# Patient Record
Sex: FEMALE | Race: WHITE | NOT HISPANIC OR LATINO
[De-identification: names, ages, dates, MRNs, and addresses within clinical notes are randomized per-mention and may not be internally consistent; named-entity substitution may affect disease eponyms.]

---

## 2017-01-05 ENCOUNTER — RX RENEWAL (OUTPATIENT)
Age: 82
End: 2017-01-05

## 2017-01-11 ENCOUNTER — RX RENEWAL (OUTPATIENT)
Age: 82
End: 2017-01-11

## 2017-01-12 ENCOUNTER — APPOINTMENT (OUTPATIENT)
Dept: CARDIOLOGY | Facility: CLINIC | Age: 82
End: 2017-01-12

## 2017-01-12 LAB — INR PPP: 2.6 RATIO

## 2017-02-02 ENCOUNTER — APPOINTMENT (OUTPATIENT)
Dept: CARDIOLOGY | Facility: CLINIC | Age: 82
End: 2017-02-02

## 2017-02-03 LAB — INR PPP: 2.8 RATIO

## 2017-02-09 ENCOUNTER — APPOINTMENT (OUTPATIENT)
Dept: CARDIOLOGY | Facility: CLINIC | Age: 82
End: 2017-02-09

## 2017-02-10 ENCOUNTER — MEDICATION RENEWAL (OUTPATIENT)
Age: 82
End: 2017-02-10

## 2017-02-17 LAB — INR PPP: 1.7 RATIO

## 2017-02-23 ENCOUNTER — RX RENEWAL (OUTPATIENT)
Age: 82
End: 2017-02-23

## 2017-02-28 ENCOUNTER — NON-APPOINTMENT (OUTPATIENT)
Age: 82
End: 2017-02-28

## 2017-02-28 ENCOUNTER — APPOINTMENT (OUTPATIENT)
Dept: CARDIOLOGY | Facility: CLINIC | Age: 82
End: 2017-02-28

## 2017-02-28 VITALS
HEART RATE: 70 BPM | DIASTOLIC BLOOD PRESSURE: 74 MMHG | SYSTOLIC BLOOD PRESSURE: 182 MMHG | BODY MASS INDEX: 24.45 KG/M2 | WEIGHT: 138 LBS | OXYGEN SATURATION: 93 % | HEIGHT: 63 IN

## 2017-02-28 LAB — INR PPP: 2.6 RATIO

## 2017-03-01 LAB
ALBUMIN SERPL ELPH-MCNC: 4 G/DL
ALP BLD-CCNC: 54 U/L
ALT SERPL-CCNC: 19 U/L
ANION GAP SERPL CALC-SCNC: 16 MMOL/L
AST SERPL-CCNC: 22 U/L
BASOPHILS # BLD AUTO: 0.01 K/UL
BASOPHILS NFR BLD AUTO: 0.1 %
BILIRUB SERPL-MCNC: 0.4 MG/DL
BUN SERPL-MCNC: 17 MG/DL
CALCIUM SERPL-MCNC: 9.5 MG/DL
CHLORIDE SERPL-SCNC: 93 MMOL/L
CHOLEST SERPL-MCNC: 158 MG/DL
CHOLEST/HDLC SERPL: 2.3 RATIO
CO2 SERPL-SCNC: 29 MMOL/L
CREAT SERPL-MCNC: 0.65 MG/DL
EOSINOPHIL # BLD AUTO: 0.07 K/UL
EOSINOPHIL NFR BLD AUTO: 0.9 %
GLUCOSE SERPL-MCNC: 101 MG/DL
HCT VFR BLD CALC: 40.1 %
HDLC SERPL-MCNC: 68 MG/DL
HGB BLD-MCNC: 12.9 G/DL
IMM GRANULOCYTES NFR BLD AUTO: 0.1 %
LDLC SERPL CALC-MCNC: 65 MG/DL
LYMPHOCYTES # BLD AUTO: 1.41 K/UL
LYMPHOCYTES NFR BLD AUTO: 17.5 %
MAN DIFF?: NORMAL
MCHC RBC-ENTMCNC: 28.8 PG
MCHC RBC-ENTMCNC: 32.2 GM/DL
MCV RBC AUTO: 89.5 FL
MONOCYTES # BLD AUTO: 0.74 K/UL
MONOCYTES NFR BLD AUTO: 9.2 %
NEUTROPHILS # BLD AUTO: 5.8 K/UL
NEUTROPHILS NFR BLD AUTO: 72.2 %
NT-PROBNP SERPL-MCNC: 872 PG/ML
PLATELET # BLD AUTO: 261 K/UL
POTASSIUM SERPL-SCNC: 5.3 MMOL/L
PROT SERPL-MCNC: 6.6 G/DL
RBC # BLD: 4.48 M/UL
RBC # FLD: 13.9 %
SODIUM SERPL-SCNC: 138 MMOL/L
TRIGL SERPL-MCNC: 126 MG/DL
TSH SERPL-ACNC: 1.89 UIU/ML
WBC # FLD AUTO: 8.04 K/UL

## 2017-03-23 ENCOUNTER — APPOINTMENT (OUTPATIENT)
Dept: CARDIOLOGY | Facility: CLINIC | Age: 82
End: 2017-03-23

## 2017-03-23 VITALS — HEART RATE: 70 BPM | BODY MASS INDEX: 24.45 KG/M2 | HEIGHT: 63 IN | WEIGHT: 138 LBS

## 2017-03-23 LAB
INR PPP: 1.5 RATIO
QUALITY CONTROL: YES

## 2017-04-04 ENCOUNTER — APPOINTMENT (OUTPATIENT)
Dept: CARDIOLOGY | Facility: CLINIC | Age: 82
End: 2017-04-04

## 2017-04-04 ENCOUNTER — NON-APPOINTMENT (OUTPATIENT)
Age: 82
End: 2017-04-04

## 2017-04-04 VITALS — BODY MASS INDEX: 25.76 KG/M2 | WEIGHT: 140 LBS | HEIGHT: 62 IN | HEART RATE: 64 BPM

## 2017-04-04 VITALS
BODY MASS INDEX: 25.76 KG/M2 | OXYGEN SATURATION: 93 % | HEART RATE: 64 BPM | HEIGHT: 62 IN | WEIGHT: 140 LBS | DIASTOLIC BLOOD PRESSURE: 69 MMHG | SYSTOLIC BLOOD PRESSURE: 144 MMHG

## 2017-04-04 LAB
INR PPP: 2.7 RATIO
QUALITY CONTROL: YES

## 2017-04-26 ENCOUNTER — APPOINTMENT (OUTPATIENT)
Dept: CARDIOLOGY | Facility: CLINIC | Age: 82
End: 2017-04-26

## 2017-04-26 VITALS — HEIGHT: 62 IN | BODY MASS INDEX: 25.76 KG/M2 | HEART RATE: 64 BPM | WEIGHT: 140 LBS

## 2017-04-26 LAB
INR PPP: 3 RATIO
QUALITY CONTROL: YES

## 2017-05-16 ENCOUNTER — APPOINTMENT (OUTPATIENT)
Dept: CARDIOLOGY | Facility: CLINIC | Age: 82
End: 2017-05-16

## 2017-05-16 VITALS — HEART RATE: 64 BPM | HEIGHT: 62 IN | WEIGHT: 140 LBS | BODY MASS INDEX: 25.76 KG/M2

## 2017-05-16 LAB
INR PPP: 2.6 RATIO
QUALITY CONTROL: YES

## 2017-06-13 ENCOUNTER — APPOINTMENT (OUTPATIENT)
Dept: CARDIOLOGY | Facility: CLINIC | Age: 82
End: 2017-06-13

## 2017-06-13 VITALS — WEIGHT: 140 LBS | BODY MASS INDEX: 25.76 KG/M2 | HEIGHT: 62 IN | HEART RATE: 64 BPM

## 2017-06-13 LAB
INR PPP: 2.1 RATIO
QUALITY CONTROL: YES

## 2017-06-20 ENCOUNTER — RX RENEWAL (OUTPATIENT)
Age: 82
End: 2017-06-20

## 2017-06-28 ENCOUNTER — APPOINTMENT (OUTPATIENT)
Dept: CARDIOLOGY | Facility: CLINIC | Age: 82
End: 2017-06-28

## 2017-06-28 VITALS — RESPIRATION RATE: 12 BRPM | HEART RATE: 68 BPM | SYSTOLIC BLOOD PRESSURE: 148 MMHG | DIASTOLIC BLOOD PRESSURE: 80 MMHG

## 2017-07-10 LAB — INR PPP: 2

## 2017-07-11 ENCOUNTER — APPOINTMENT (OUTPATIENT)
Dept: CARDIOLOGY | Facility: CLINIC | Age: 82
End: 2017-07-11

## 2017-08-09 LAB — INR PPP: 2.3

## 2017-09-06 LAB — INR PPP: 1.6

## 2017-09-20 LAB — INR PPP: 2

## 2017-10-11 LAB — INR PPP: 2.7

## 2017-11-06 LAB — INR PPP: 2.5

## 2017-12-04 ENCOUNTER — OTHER (OUTPATIENT)
Age: 82
End: 2017-12-04

## 2017-12-04 LAB — INR PPP: 2.3

## 2018-01-03 ENCOUNTER — NON-APPOINTMENT (OUTPATIENT)
Age: 83
End: 2018-01-03

## 2018-01-03 ENCOUNTER — APPOINTMENT (OUTPATIENT)
Dept: CARDIOLOGY | Facility: CLINIC | Age: 83
End: 2018-01-03
Payer: MEDICARE

## 2018-01-03 VITALS
HEART RATE: 64 BPM | WEIGHT: 140 LBS | DIASTOLIC BLOOD PRESSURE: 71 MMHG | OXYGEN SATURATION: 97 % | HEIGHT: 62 IN | BODY MASS INDEX: 25.76 KG/M2 | SYSTOLIC BLOOD PRESSURE: 162 MMHG

## 2018-01-03 PROCEDURE — 85610 PROTHROMBIN TIME: CPT | Mod: QW

## 2018-01-03 PROCEDURE — 93000 ELECTROCARDIOGRAM COMPLETE: CPT

## 2018-01-03 PROCEDURE — 99214 OFFICE O/P EST MOD 30 MIN: CPT

## 2018-01-03 RX ORDER — FUROSEMIDE 20 MG/1
20 TABLET ORAL DAILY
Qty: 1 | Refills: 3 | Status: DISCONTINUED | COMMUNITY
Start: 2017-02-28 | End: 2018-01-03

## 2018-01-05 VITALS — HEIGHT: 62 IN | BODY MASS INDEX: 25.76 KG/M2 | WEIGHT: 140 LBS | HEART RATE: 64 BPM

## 2018-01-05 LAB — INR PPP: 2.8

## 2018-01-08 ENCOUNTER — RX RENEWAL (OUTPATIENT)
Age: 83
End: 2018-01-08

## 2018-02-02 LAB — INR PPP: 2.5

## 2018-02-28 LAB — INR PPP: 2.8

## 2018-03-05 ENCOUNTER — APPOINTMENT (OUTPATIENT)
Dept: CARDIOLOGY | Facility: CLINIC | Age: 83
End: 2018-03-05
Payer: MEDICARE

## 2018-03-05 ENCOUNTER — NON-APPOINTMENT (OUTPATIENT)
Age: 83
End: 2018-03-05

## 2018-03-05 VITALS
BODY MASS INDEX: 27.05 KG/M2 | OXYGEN SATURATION: 98 % | SYSTOLIC BLOOD PRESSURE: 155 MMHG | HEIGHT: 62 IN | WEIGHT: 147 LBS | DIASTOLIC BLOOD PRESSURE: 76 MMHG | HEART RATE: 69 BPM

## 2018-03-05 PROCEDURE — 93000 ELECTROCARDIOGRAM COMPLETE: CPT

## 2018-03-05 PROCEDURE — 99214 OFFICE O/P EST MOD 30 MIN: CPT

## 2018-03-28 ENCOUNTER — EMERGENCY (EMERGENCY)
Facility: HOSPITAL | Age: 83
LOS: 1 days | Discharge: HOME | End: 2018-03-28
Attending: STUDENT IN AN ORGANIZED HEALTH CARE EDUCATION/TRAINING PROGRAM | Admitting: STUDENT IN AN ORGANIZED HEALTH CARE EDUCATION/TRAINING PROGRAM
Payer: MEDICARE

## 2018-03-28 VITALS
SYSTOLIC BLOOD PRESSURE: 173 MMHG | TEMPERATURE: 99 F | DIASTOLIC BLOOD PRESSURE: 98 MMHG | HEART RATE: 100 BPM | OXYGEN SATURATION: 95 % | RESPIRATION RATE: 20 BRPM

## 2018-03-28 VITALS
TEMPERATURE: 98 F | HEART RATE: 76 BPM | SYSTOLIC BLOOD PRESSURE: 130 MMHG | DIASTOLIC BLOOD PRESSURE: 60 MMHG | RESPIRATION RATE: 14 BRPM | OXYGEN SATURATION: 100 %

## 2018-03-28 DIAGNOSIS — Z96.60 PRESENCE OF UNSPECIFIED ORTHOPEDIC JOINT IMPLANT: Chronic | ICD-10-CM

## 2018-03-28 LAB
ALBUMIN SERPL ELPH-MCNC: 3.7 G/DL — SIGNIFICANT CHANGE UP (ref 3.3–5)
ALP SERPL-CCNC: 59 U/L — SIGNIFICANT CHANGE UP (ref 40–120)
ALT FLD-CCNC: 38 U/L — SIGNIFICANT CHANGE UP (ref 12–78)
ANION GAP SERPL CALC-SCNC: 8 MMOL/L — SIGNIFICANT CHANGE UP (ref 5–17)
AST SERPL-CCNC: 24 U/L — SIGNIFICANT CHANGE UP (ref 15–37)
BASOPHILS # BLD AUTO: 0.1 K/UL — SIGNIFICANT CHANGE UP (ref 0–0.2)
BASOPHILS NFR BLD AUTO: 1.1 % — SIGNIFICANT CHANGE UP (ref 0–2)
BILIRUB SERPL-MCNC: 0.3 MG/DL — SIGNIFICANT CHANGE UP (ref 0.2–1.2)
BUN SERPL-MCNC: 22 MG/DL — SIGNIFICANT CHANGE UP (ref 7–23)
CALCIUM SERPL-MCNC: 8.6 MG/DL — SIGNIFICANT CHANGE UP (ref 8.5–10.1)
CHLORIDE SERPL-SCNC: 94 MMOL/L — LOW (ref 96–108)
CK MB BLD-MCNC: 1.9 % — SIGNIFICANT CHANGE UP (ref 0–3.5)
CK MB CFR SERPL CALC: 3.2 NG/ML — SIGNIFICANT CHANGE UP (ref 0–3.6)
CK SERPL-CCNC: 166 U/L — SIGNIFICANT CHANGE UP (ref 26–192)
CO2 SERPL-SCNC: 31 MMOL/L — SIGNIFICANT CHANGE UP (ref 22–31)
CREAT SERPL-MCNC: 0.67 MG/DL — SIGNIFICANT CHANGE UP (ref 0.5–1.3)
EOSINOPHIL # BLD AUTO: 0.2 K/UL — SIGNIFICANT CHANGE UP (ref 0–0.5)
EOSINOPHIL NFR BLD AUTO: 3 % — SIGNIFICANT CHANGE UP (ref 0–6)
GLUCOSE SERPL-MCNC: 128 MG/DL — HIGH (ref 70–99)
HCT VFR BLD CALC: 43.3 % — SIGNIFICANT CHANGE UP (ref 34.5–45)
HGB BLD-MCNC: 14.5 G/DL — SIGNIFICANT CHANGE UP (ref 11.5–15.5)
LYMPHOCYTES # BLD AUTO: 1.6 K/UL — SIGNIFICANT CHANGE UP (ref 1–3.3)
LYMPHOCYTES # BLD AUTO: 25.5 % — SIGNIFICANT CHANGE UP (ref 13–44)
MCHC RBC-ENTMCNC: 29.9 PG — SIGNIFICANT CHANGE UP (ref 27–34)
MCHC RBC-ENTMCNC: 33.6 GM/DL — SIGNIFICANT CHANGE UP (ref 32–36)
MCV RBC AUTO: 88.9 FL — SIGNIFICANT CHANGE UP (ref 80–100)
MONOCYTES # BLD AUTO: 0.8 K/UL — SIGNIFICANT CHANGE UP (ref 0–0.9)
MONOCYTES NFR BLD AUTO: 12.4 % — HIGH (ref 1–9)
NEUTROPHILS # BLD AUTO: 3.6 K/UL — SIGNIFICANT CHANGE UP (ref 1.8–7.4)
NEUTROPHILS NFR BLD AUTO: 58 % — SIGNIFICANT CHANGE UP (ref 43–77)
NT-PROBNP SERPL-SCNC: 282 PG/ML — SIGNIFICANT CHANGE UP (ref 0–450)
PLATELET # BLD AUTO: 209 K/UL — SIGNIFICANT CHANGE UP (ref 150–400)
POTASSIUM SERPL-MCNC: 2.9 MMOL/L — CRITICAL LOW (ref 3.5–5.3)
POTASSIUM SERPL-MCNC: 3.9 MMOL/L — SIGNIFICANT CHANGE UP (ref 3.5–5.3)
POTASSIUM SERPL-SCNC: 2.9 MMOL/L — CRITICAL LOW (ref 3.5–5.3)
POTASSIUM SERPL-SCNC: 3.9 MMOL/L — SIGNIFICANT CHANGE UP (ref 3.5–5.3)
PROT SERPL-MCNC: 7.1 G/DL — SIGNIFICANT CHANGE UP (ref 6–8.3)
RBC # BLD: 4.87 M/UL — SIGNIFICANT CHANGE UP (ref 3.8–5.2)
RBC # FLD: 12.5 % — SIGNIFICANT CHANGE UP (ref 10.3–14.5)
SODIUM SERPL-SCNC: 133 MMOL/L — LOW (ref 135–145)
TROPONIN I SERPL-MCNC: <.015 NG/ML — SIGNIFICANT CHANGE UP (ref 0.01–0.04)
WBC # BLD: 6.1 K/UL — SIGNIFICANT CHANGE UP (ref 3.8–10.5)
WBC # FLD AUTO: 6.1 K/UL — SIGNIFICANT CHANGE UP (ref 3.8–10.5)

## 2018-03-28 PROCEDURE — 71045 X-RAY EXAM CHEST 1 VIEW: CPT

## 2018-03-28 PROCEDURE — 82553 CREATINE MB FRACTION: CPT

## 2018-03-28 PROCEDURE — 99285 EMERGENCY DEPT VISIT HI MDM: CPT

## 2018-03-28 PROCEDURE — 84484 ASSAY OF TROPONIN QUANT: CPT

## 2018-03-28 PROCEDURE — 99284 EMERGENCY DEPT VISIT MOD MDM: CPT | Mod: 25

## 2018-03-28 PROCEDURE — 80053 COMPREHEN METABOLIC PANEL: CPT

## 2018-03-28 PROCEDURE — 96366 THER/PROPH/DIAG IV INF ADDON: CPT

## 2018-03-28 PROCEDURE — 82550 ASSAY OF CK (CPK): CPT

## 2018-03-28 PROCEDURE — 96365 THER/PROPH/DIAG IV INF INIT: CPT

## 2018-03-28 PROCEDURE — 84132 ASSAY OF SERUM POTASSIUM: CPT

## 2018-03-28 PROCEDURE — 85027 COMPLETE CBC AUTOMATED: CPT

## 2018-03-28 PROCEDURE — 83880 ASSAY OF NATRIURETIC PEPTIDE: CPT

## 2018-03-28 PROCEDURE — 93005 ELECTROCARDIOGRAM TRACING: CPT

## 2018-03-28 PROCEDURE — 71045 X-RAY EXAM CHEST 1 VIEW: CPT | Mod: 26

## 2018-03-28 RX ORDER — POTASSIUM CHLORIDE 20 MEQ
10 PACKET (EA) ORAL ONCE
Qty: 0 | Refills: 0 | Status: COMPLETED | OUTPATIENT
Start: 2018-03-28 | End: 2018-03-28

## 2018-03-28 RX ORDER — FUROSEMIDE 40 MG
1 TABLET ORAL
Qty: 0 | Refills: 0 | COMMUNITY

## 2018-03-28 RX ORDER — POTASSIUM CHLORIDE 20 MEQ
40 PACKET (EA) ORAL ONCE
Qty: 0 | Refills: 0 | Status: COMPLETED | OUTPATIENT
Start: 2018-03-28 | End: 2018-03-28

## 2018-03-28 RX ORDER — ZOLPIDEM TARTRATE 10 MG/1
1 TABLET ORAL
Qty: 0 | Refills: 0 | COMMUNITY

## 2018-03-28 RX ORDER — PANTOPRAZOLE SODIUM 20 MG/1
1 TABLET, DELAYED RELEASE ORAL
Qty: 0 | Refills: 0 | COMMUNITY

## 2018-03-28 RX ORDER — ESCITALOPRAM OXALATE 10 MG/1
0 TABLET, FILM COATED ORAL
Qty: 0 | Refills: 0 | COMMUNITY

## 2018-03-28 RX ORDER — METOPROLOL TARTRATE 50 MG
1 TABLET ORAL
Qty: 0 | Refills: 0 | COMMUNITY

## 2018-03-28 RX ORDER — SODIUM CHLORIDE 9 MG/ML
1000 INJECTION INTRAMUSCULAR; INTRAVENOUS; SUBCUTANEOUS ONCE
Qty: 0 | Refills: 0 | Status: COMPLETED | OUTPATIENT
Start: 2018-03-28 | End: 2018-03-28

## 2018-03-28 RX ORDER — NORTRIPTYLINE HYDROCHLORIDE 10 MG/1
1 CAPSULE ORAL
Qty: 0 | Refills: 0 | COMMUNITY

## 2018-03-28 RX ORDER — CHOLECALCIFEROL (VITAMIN D3) 125 MCG
1 CAPSULE ORAL
Qty: 0 | Refills: 0 | COMMUNITY

## 2018-03-28 RX ORDER — POTASSIUM CHLORIDE 20 MEQ
1 PACKET (EA) ORAL
Qty: 0 | Refills: 0 | COMMUNITY

## 2018-03-28 RX ORDER — DOXAZOSIN MESYLATE 4 MG
1 TABLET ORAL
Qty: 0 | Refills: 0 | COMMUNITY

## 2018-03-28 RX ORDER — WARFARIN SODIUM 2.5 MG/1
1 TABLET ORAL
Qty: 0 | Refills: 0 | COMMUNITY

## 2018-03-28 RX ORDER — SODIUM CHLORIDE 9 MG/ML
3 INJECTION INTRAMUSCULAR; INTRAVENOUS; SUBCUTANEOUS ONCE
Qty: 0 | Refills: 0 | Status: COMPLETED | OUTPATIENT
Start: 2018-03-28 | End: 2018-03-28

## 2018-03-28 RX ADMIN — Medication 100 MILLIEQUIVALENT(S): at 07:11

## 2018-03-28 RX ADMIN — Medication 40 MILLIEQUIVALENT(S): at 05:15

## 2018-03-28 RX ADMIN — SODIUM CHLORIDE 500 MILLILITER(S): 9 INJECTION INTRAMUSCULAR; INTRAVENOUS; SUBCUTANEOUS at 05:15

## 2018-03-28 RX ADMIN — SODIUM CHLORIDE 3 MILLILITER(S): 9 INJECTION INTRAMUSCULAR; INTRAVENOUS; SUBCUTANEOUS at 05:18

## 2018-03-28 RX ADMIN — Medication 100 MILLIEQUIVALENT(S): at 05:14

## 2018-03-28 NOTE — ED ADULT NURSE NOTE - OBJECTIVE STATEMENT
Pt to ED via EMS with c/c tachycardia. Pt states she got up to use the bathroom around 0200 and when she returned to bed, she felt as if her heart was beating fast. Pt states she feels better now other than mild nausea. Pt is noted with irregular heart beat, frequent PAC's

## 2018-03-28 NOTE — CONSULT NOTE ADULT - SUBJECTIVE AND OBJECTIVE BOX
[facility] - Mica Berumen, Veronique, Adriana, Francis, Atul Otero  Office Number: [phone]    Initial Consult Note  85 year female with a history of high cholesterol, a-fib, HTN presents with rapid heart beat.  She states she woke up at 2:00am to use the bathroom, felt her heart beating fast.   Associated with nausea.  Denies chest pain, SOB, weakness.  She has a history of a-fib, diagnosed last year, on warfarin 5mg QD. In ED, EKG shows sinus rhythm with PACs at 89bpm. Pt states she feels much improved. PMD Rob Perez, Cardiology Dr. Piper.        PAST MEDICAL & SURGICAL HISTORY:  Hyperlipidemia  Afib  Depression  Anxiety  HTN (hypertension)  S/P hip hemiarthroplasty      SOCIAL HISTORY:  No tobacco, ethanol, or drug abuse.    FAMILY HISTORY:  No pertinent family history in first degree relatives    No family history of acute MI or sudden cardiac death.    MEDICATIONS  (STANDING):    MEDICATIONS  (PRN):      Allergies    bromide (Unknown)    Intolerances        REVIEW OF SYSTEMS:    CONSTITUTIONAL: No weakness, fevers or chills  EYES/ENT: No visual changes;  No vertigo or throat pain   NECK: No pain or stiffness  RESPIRATORY: No cough, wheezing, hemoptysis; No shortness of breath  CARDIOVASCULAR: No chest pain or palpitations  GASTROINTESTINAL: No abdominal pain. No nausea, vomiting, or hematemesis; No diarrhea or constipation. No melena or hematochezia.  GENITOURINARY: No dysuria, frequency or hematuria  NEUROLOGICAL: No numbness or weakness  SKIN: No itching or rash  All other review of systems is negative unless indicated above    VITAL SIGNS:   Vital Signs Last 24 Hrs  T(C): 36.8 (28 Mar 2018 07:13), Max: 37.4 (28 Mar 2018 03:27)  T(F): 98.2 (28 Mar 2018 07:13), Max: 99.4 (28 Mar 2018 03:27)  HR: 74 (28 Mar 2018 07:13) (74 - 100)  BP: 150/55 (28 Mar 2018 07:13) (150/55 - 173/98)  BP(mean): --  RR: 14 (28 Mar 2018 07:13) (14 - 20)  SpO2: 100% (28 Mar 2018 07:13) (95% - 100%)    I&O's Summary      On Exam:    Constitutional: NAD, alert and oriented x 3  Lungs:  Non-labored, No wheezing, rales, soft diffuse crackles on LLL field  Cardiovascular: RRR.  S1 and S2 positive.  No murmurs, rubs, gallops or clicks  Gastrointestinal: Bowel Sounds present, soft, nontender.   Lymph: No peripheral edema. No cervical lymphadenopathy.  Neurological: Alert, no focal deficits  Skin: No rashes or ulcers   Psych:  Mood & affect appropriate.    LABS: All Labs Reviewed:                        14.5   6.1   )-----------( 209      ( 28 Mar 2018 03:58 )             43.3     28 Mar 2018 03:58    133    |  94     |  22     ----------------------------<  128    2.9     |  31     |  0.67     Ca    8.6        28 Mar 2018 03:58    TPro  7.1    /  Alb  3.7    /  TBili  0.3    /  DBili  x      /  AST  24     /  ALT  38     /  AlkPhos  59     28 Mar 2018 03:58      CARDIAC MARKERS ( 28 Mar 2018 03:58 )  <.015 ng/mL / x     / 166 U/L / x     / 3.2 ng/mL      Blood Culture:   03-28 @ 03:58  Pro Bnp 282        RADIOLOGY:    EKG:    Sinus with PACs [facility] - Mica Berumen, Veronique, Adriana, Francis, Atul Otero  Office Number: [phone]    Initial Consult Note  85 year female with a history of high cholesterol, a-fib, HTN presents with rapid heart beat.  She states she woke up at 2:00am to use the bathroom, felt her heart beating fast.   Associated with nausea.  Denies chest pain, SOB, weakness.  She has a history of a-fib, diagnosed last year, on warfarin 5mg QD. In ED, EKG shows sinus rhythm with PACs at 89bpm. Pt states she feels much improved. PMD Rob Perez, Cardiology Dr. Piper.        PAST MEDICAL & SURGICAL HISTORY:  Hyperlipidemia  Afib  Depression  Anxiety  HTN (hypertension)  S/P hip hemiarthroplasty      SOCIAL HISTORY:  No tobacco, ethanol, or drug abuse.    FAMILY HISTORY:  No pertinent family history in first degree relatives    No family history of acute MI or sudden cardiac death.    MEDICATIONS  (STANDING):    MEDICATIONS  (PRN):      Allergies    bromide (Unknown)    Intolerances        REVIEW OF SYSTEMS:    CONSTITUTIONAL: No weakness, fevers or chills  EYES/ENT: No visual changes;  No vertigo or throat pain   NECK: No pain or stiffness  RESPIRATORY: No cough, wheezing, hemoptysis; No shortness of breath  CARDIOVASCULAR: No chest pain or palpitations  GASTROINTESTINAL: No abdominal pain. No nausea, vomiting, or hematemesis; No diarrhea or constipation. No melena or hematochezia.  GENITOURINARY: No dysuria, frequency or hematuria  NEUROLOGICAL: No numbness or weakness  SKIN: No itching or rash  All other review of systems is negative unless indicated above    VITAL SIGNS:   Vital Signs Last 24 Hrs  T(C): 36.8 (28 Mar 2018 07:13), Max: 37.4 (28 Mar 2018 03:27)  T(F): 98.2 (28 Mar 2018 07:13), Max: 99.4 (28 Mar 2018 03:27)  HR: 74 (28 Mar 2018 07:13) (74 - 100)  BP: 150/55 (28 Mar 2018 07:13) (150/55 - 173/98)  BP(mean): --  RR: 14 (28 Mar 2018 07:13) (14 - 20)  SpO2: 100% (28 Mar 2018 07:13) (95% - 100%)    I&O's Summary      On Exam:    Constitutional: NAD, alert and oriented x 3  Lungs:  Non-labored, No wheezing, rales, soft diffuse crackles on LLL field  Cardiovascular: RRR.  S1 and S2 positive.  No murmurs, rubs, gallops or clicks  Gastrointestinal: Bowel Sounds present, soft, nontender.   Lymph: No peripheral edema. No cervical lymphadenopathy.  Neurological: Alert, no focal deficits  Skin: No rashes or ulcers   Psych:  Mood & affect appropriate.    LABS: All Labs Reviewed:                        14.5   6.1   )-----------( 209      ( 28 Mar 2018 03:58 )             43.3     28 Mar 2018 03:58    133    |  94     |  22     ----------------------------<  128    2.9     |  31     |  0.67     Ca    8.6        28 Mar 2018 03:58    TPro  7.1    /  Alb  3.7    /  TBili  0.3    /  DBili  x      /  AST  24     /  ALT  38     /  AlkPhos  59     28 Mar 2018 03:58      CARDIAC MARKERS ( 28 Mar 2018 03:58 )  <.015 ng/mL / x     / 166 U/L / x     / 3.2 ng/mL      Blood Culture:   03-28 @ 03:58  Pro Bnp 282      EKG:    Sinus with PACs, pattern of quadrigeminy

## 2018-03-28 NOTE — ED PROVIDER NOTE - CHPI ED SYMPTOMS NEG
no vomiting/no chills/no back pain/no dizziness/no shortness of breath/no fever/no diaphoresis/no chest pain/no cough

## 2018-03-28 NOTE — ED ADULT NURSE NOTE - PSH
Pt c/o pain to the right elbow. Pt states that she is unable to bend the elbow or use her right hand. Pt denies any source of injury. denies hx of gout. +pulses, color to right arm is pink, skin is warm and dry. Will continue to monitor. S/P hip hemiarthroplasty

## 2018-03-28 NOTE — CONSULT NOTE ADULT - ASSESSMENT
85 year female with a history of high cholesterol, a-fib, HTN presents with rapid heart beat, SVT    - Initial rhythm strip from 0249 shows SVT, but followup EKG shows sinus with PACs  - No clear evidence of acute ischemia, trops negative x 1.  - No evidence of volume overload, ProBNP 282  - No acute ischemic changes on EKG compared to previous  - Afib rate controlled with metoprolol succ 25mg BID, on AC (Warfarin 5mg QD)  - BP elevated, continue home BP meds, monitor routine hemodynamics  - monitor and replete lytes, keep K>4, Mg>2  - Pt has no history of MI, active CAD, ADHF or severe valvular disease.   - Other cardiovascular workup will depend on clinical course.  - All other workup per primary team  - Doubt need for admission 85 year female with a history of high cholesterol, a-fib, HTN presents with rapid heart beat, SVT    - Initial rhythm strip from 0249 shows SVT, but followup EKG shows sinus with PACs  - SVT most likely the results of hypokalemia, and enhanced by PACs  - Follow up potassium on repeat BMP, s/p KCl 40meq PO and KCl 20meq IV  - SVT terminated by vagal stimulation by EMS, as per report  - No clear evidence of acute ischemia, trops negative x 1.  - No evidence of volume overload, ProBNP 282  - No acute ischemic changes on EKG compared to previous  - Afib rate controlled with metoprolol succ 25mg BID, on AC (Warfarin 5mg QD)  - BP elevated, continue home BP meds, monitor routine hemodynamics  - monitor and replete lytes, keep K>4, Mg>2  - Pt has no history of MI, active CAD, ADHF or severe valvular disease.   - Other cardiovascular workup will depend on clinical course.  - All other workup per primary team  - Doubt need for admission 85 year female with a history of high cholesterol, a-fib, HTN presents with rapid heart beat, SVT    - Initial rhythm strip from 0249 shows SVT, but followup EKG shows sinus with PACs  - SVT most likely the results of hypokalemia, and triggered by PACs.  cannot fully exclude paf/flutter though appears very regular and was terminated by ems with a vagal maneuver  - Follow up potassium on repeat BMP, s/p KCl 40meq PO and KCl 20meq IV  - No clear evidence of acute ischemia  - No evidence of volume overload, ProBNP 282  - No acute ischemic changes on EKG compared to previous  - Afib rate controlled with metoprolol succ 25mg BID, on AC (Warfarin 5mg QD)  - BP elevated, continue home BP meds, monitor routine hemodynamics  - monitor and replete lytes, keep K>4, Mg>2  - Pt has no history of MI, active CAD, ADHF or severe valvular disease.   - Other cardiovascular workup will depend on clinical course.  -if k adequately repleted and she remains clinically stable she can be dc from a cv perspective

## 2018-03-28 NOTE — ED PROVIDER NOTE - OBJECTIVE STATEMENT
85 year female with a history of high cholesterol, a-fib, HTN presents with rapid heart beat.  She states she woke up at 2:00am to use the bathroom, felt her heart beating fast.   Associated with nausea.  Denies chest pain, SOB, weakness.  She has a history of a-fib, diagnosed last year.  PMD Rob Perez, Cardiology Dr. Piper.

## 2018-03-28 NOTE — CONSULT NOTE ADULT - ATTENDING COMMENTS
The patient was seen and examined with housestaff.  All elements of the note were edited where appropriate.

## 2018-03-28 NOTE — ED ADULT NURSE NOTE - PMH
Anxiety    Depression    HTN (hypertension) Afib    Anxiety    Depression    HTN (hypertension)    Hyperlipidemia

## 2018-03-28 NOTE — ED ADULT NURSE NOTE - CHPI ED SYMPTOMS NEG
no cough/no syncope/no dizziness/no chest pain/no chills/no shortness of breath/no vomiting/no diaphoresis/no fever/no back pain

## 2018-03-30 ENCOUNTER — RX RENEWAL (OUTPATIENT)
Age: 83
End: 2018-03-30

## 2018-04-04 ENCOUNTER — APPOINTMENT (OUTPATIENT)
Dept: CARDIOLOGY | Facility: CLINIC | Age: 83
End: 2018-04-04
Payer: MEDICARE

## 2018-04-04 ENCOUNTER — NON-APPOINTMENT (OUTPATIENT)
Age: 83
End: 2018-04-04

## 2018-04-04 VITALS
DIASTOLIC BLOOD PRESSURE: 73 MMHG | HEIGHT: 62 IN | HEART RATE: 65 BPM | OXYGEN SATURATION: 92 % | SYSTOLIC BLOOD PRESSURE: 137 MMHG | WEIGHT: 147 LBS | BODY MASS INDEX: 27.05 KG/M2

## 2018-04-04 LAB — INR PPP: 2.7

## 2018-04-04 PROCEDURE — 99214 OFFICE O/P EST MOD 30 MIN: CPT

## 2018-04-04 PROCEDURE — 93000 ELECTROCARDIOGRAM COMPLETE: CPT

## 2018-04-08 ENCOUNTER — RESULT CHARGE (OUTPATIENT)
Age: 83
End: 2018-04-08

## 2018-05-07 ENCOUNTER — APPOINTMENT (OUTPATIENT)
Dept: CARDIOLOGY | Facility: CLINIC | Age: 83
End: 2018-05-07

## 2018-05-13 LAB — INR PPP: 2.77

## 2018-05-14 ENCOUNTER — NON-APPOINTMENT (OUTPATIENT)
Age: 83
End: 2018-05-14

## 2018-05-14 ENCOUNTER — APPOINTMENT (OUTPATIENT)
Dept: CARDIOLOGY | Facility: CLINIC | Age: 83
End: 2018-05-14
Payer: MEDICARE

## 2018-05-14 VITALS
BODY MASS INDEX: 25.76 KG/M2 | HEART RATE: 67 BPM | HEIGHT: 62 IN | SYSTOLIC BLOOD PRESSURE: 184 MMHG | OXYGEN SATURATION: 96 % | WEIGHT: 140 LBS | DIASTOLIC BLOOD PRESSURE: 79 MMHG

## 2018-05-14 PROCEDURE — 99214 OFFICE O/P EST MOD 30 MIN: CPT

## 2018-05-14 PROCEDURE — 93000 ELECTROCARDIOGRAM COMPLETE: CPT

## 2018-05-14 RX ORDER — POTASSIUM CHLORIDE 750 MG/1
10 CAPSULE, EXTENDED RELEASE ORAL EVERY OTHER DAY
Refills: 3 | Status: DISCONTINUED | COMMUNITY
Start: 2017-02-28 | End: 2018-05-14

## 2018-05-14 RX ORDER — CHLORTHALIDONE 25 MG/1
25 TABLET ORAL DAILY
Qty: 90 | Refills: 3 | Status: DISCONTINUED | COMMUNITY
Start: 2018-01-03 | End: 2018-05-14

## 2018-05-24 LAB — INR PPP: 3.4

## 2018-06-07 LAB — INR PPP: 3.71

## 2018-06-13 ENCOUNTER — EMERGENCY (EMERGENCY)
Facility: HOSPITAL | Age: 83
LOS: 1 days | Discharge: HOME | End: 2018-06-13
Attending: EMERGENCY MEDICINE
Payer: MEDICARE

## 2018-06-13 ENCOUNTER — OTHER (OUTPATIENT)
Age: 83
End: 2018-06-13

## 2018-06-13 VITALS
HEART RATE: 75 BPM | DIASTOLIC BLOOD PRESSURE: 65 MMHG | RESPIRATION RATE: 16 BRPM | SYSTOLIC BLOOD PRESSURE: 155 MMHG | TEMPERATURE: 98 F | OXYGEN SATURATION: 98 %

## 2018-06-13 VITALS
DIASTOLIC BLOOD PRESSURE: 91 MMHG | SYSTOLIC BLOOD PRESSURE: 188 MMHG | TEMPERATURE: 98 F | OXYGEN SATURATION: 98 % | RESPIRATION RATE: 18 BRPM | HEIGHT: 63 IN | WEIGHT: 139.99 LBS | HEART RATE: 71 BPM

## 2018-06-13 DIAGNOSIS — Z96.60 PRESENCE OF UNSPECIFIED ORTHOPEDIC JOINT IMPLANT: Chronic | ICD-10-CM

## 2018-06-13 LAB
APTT BLD: 38.6 SEC — HIGH (ref 27.5–37.4)
INR BLD: 2.95 RATIO — HIGH (ref 0.88–1.16)
PROTHROM AB SERPL-ACNC: 32.9 SEC — HIGH (ref 9.8–12.7)

## 2018-06-13 PROCEDURE — 99284 EMERGENCY DEPT VISIT MOD MDM: CPT

## 2018-06-13 PROCEDURE — 36415 COLL VENOUS BLD VENIPUNCTURE: CPT

## 2018-06-13 PROCEDURE — 85610 PROTHROMBIN TIME: CPT

## 2018-06-13 PROCEDURE — 70450 CT HEAD/BRAIN W/O DYE: CPT

## 2018-06-13 PROCEDURE — 85730 THROMBOPLASTIN TIME PARTIAL: CPT

## 2018-06-13 PROCEDURE — 70450 CT HEAD/BRAIN W/O DYE: CPT | Mod: 26

## 2018-06-13 PROCEDURE — 99284 EMERGENCY DEPT VISIT MOD MDM: CPT | Mod: 25

## 2018-06-13 NOTE — ED ADULT NURSE NOTE - NEURO WDL
1915:  Assumed care of patient from  [facility], [address] [place] [address]. Pt in bed resting with family and baby at bedside. Pt states she has no c/o pain at this time 0/10. Mag running at 2g with LR at 76. Plan of care discussed with pt to continue mag checks until 2300 and at that time it will be d/c. Pt request to get up to shower afterwards. Pt advised may have to wait until the morning but will discuss with provider. Vickie care performed F firm at U scant blood noted. Caicedo in place. 2244:  Mag stopped. Caicedo removed    2257:  Spoke [facility] AFRICA Alvarez CNM  Order to give PO labetolol. Alert and oriented to person, place and time, memory intact, behavior appropriate to situation.

## 2018-06-13 NOTE — ED ADULT TRIAGE NOTE - CHIEF COMPLAINT QUOTE
patient s/p fall down 1 step hitting head, denies LOC, patient is on Coumadin for Afib. patient reports fall 4/6/18 resulting in vertibrae and rib fractures. Patient reports occipital pain. Denies blurred vision. no neurological deficits noted.

## 2018-06-13 NOTE — ED PROVIDER NOTE - CHPI ED SYMPTOMS NEG
no blurred vision/no weakness/no vomiting/no nausea/no dizziness/no loss of consciousness/no change in level of consciousness/no confusion

## 2018-06-13 NOTE — ED PROVIDER NOTE - PROGRESS NOTE DETAILS
Reevaluated patient at bedside.  Patient feeling well, ambulating in er.  Discussed the results of CT and copy of report given.   An opportunity to ask questions was given.  Discussed the importance of prompt, close medical follow-up.  Patient will return with any changes, concerns or persistent / worsening symptoms.  Understanding of all instructions verbalized.

## 2018-06-13 NOTE — ED ADULT NURSE NOTE - OBJECTIVE STATEMENT
Pt presents to ED via EMS S/O fall at home. Pt reports she was relearning how to walk down stairs with her back brace on, accompanied by her PT. Pt reports missing bottom step, stumbling, hitting midline posterior head on wall, then falling. Pt reports taking Coumadin. Pt denies loss of consciousness, dizziness, N/V or any additional complaints

## 2018-07-13 LAB — INR PPP: 2.22

## 2018-08-02 LAB — INR PPP: 2.32

## 2018-08-17 ENCOUNTER — NON-APPOINTMENT (OUTPATIENT)
Age: 83
End: 2018-08-17

## 2018-08-17 ENCOUNTER — APPOINTMENT (OUTPATIENT)
Dept: CARDIOLOGY | Facility: CLINIC | Age: 83
End: 2018-08-17
Payer: MEDICARE

## 2018-08-17 VITALS
BODY MASS INDEX: 25.95 KG/M2 | SYSTOLIC BLOOD PRESSURE: 185 MMHG | OXYGEN SATURATION: 92 % | HEART RATE: 69 BPM | DIASTOLIC BLOOD PRESSURE: 80 MMHG | WEIGHT: 141 LBS | HEIGHT: 62 IN

## 2018-08-17 PROCEDURE — 93000 ELECTROCARDIOGRAM COMPLETE: CPT

## 2018-08-17 PROCEDURE — 99214 OFFICE O/P EST MOD 30 MIN: CPT

## 2018-08-17 RX ORDER — NORTRIPTYLINE HYDROCHLORIDE 25 MG/1
25 CAPSULE ORAL DAILY
Refills: 0 | Status: DISCONTINUED | COMMUNITY
Start: 2018-01-03 | End: 2018-08-17

## 2018-08-17 RX ORDER — CHLORTHALIDONE 25 MG/1
25 TABLET ORAL DAILY
Qty: 90 | Refills: 3 | Status: DISCONTINUED | COMMUNITY
Start: 2018-06-13 | End: 2018-08-17

## 2018-08-18 PROBLEM — I48.91 UNSPECIFIED ATRIAL FIBRILLATION: Chronic | Status: ACTIVE | Noted: 2018-03-28

## 2018-08-18 PROBLEM — E78.5 HYPERLIPIDEMIA, UNSPECIFIED: Chronic | Status: ACTIVE | Noted: 2018-03-28

## 2018-08-31 ENCOUNTER — RESULT REVIEW (OUTPATIENT)
Age: 83
End: 2018-08-31

## 2018-08-31 LAB — INR PPP: 3.05

## 2018-09-14 ENCOUNTER — RESULT REVIEW (OUTPATIENT)
Age: 83
End: 2018-09-14

## 2018-09-14 LAB — INR PPP: 2.25

## 2018-09-28 ENCOUNTER — APPOINTMENT (OUTPATIENT)
Dept: CARDIOLOGY | Facility: CLINIC | Age: 83
End: 2018-09-28
Payer: MEDICARE

## 2018-09-28 ENCOUNTER — NON-APPOINTMENT (OUTPATIENT)
Age: 83
End: 2018-09-28

## 2018-09-28 VITALS
BODY MASS INDEX: 25.76 KG/M2 | WEIGHT: 140 LBS | HEART RATE: 65 BPM | OXYGEN SATURATION: 94 % | DIASTOLIC BLOOD PRESSURE: 80 MMHG | SYSTOLIC BLOOD PRESSURE: 176 MMHG | HEIGHT: 62 IN

## 2018-09-28 PROCEDURE — 93000 ELECTROCARDIOGRAM COMPLETE: CPT

## 2018-09-28 PROCEDURE — 99214 OFFICE O/P EST MOD 30 MIN: CPT

## 2018-09-28 RX ORDER — DOXAZOSIN 2 MG/1
2 TABLET ORAL DAILY
Qty: 90 | Refills: 3 | Status: DISCONTINUED | COMMUNITY
Start: 2017-02-28 | End: 2018-09-28

## 2018-10-15 ENCOUNTER — RESULT REVIEW (OUTPATIENT)
Age: 83
End: 2018-10-15

## 2018-10-15 LAB — INR PPP: 2.53

## 2018-11-09 ENCOUNTER — RESULT REVIEW (OUTPATIENT)
Age: 83
End: 2018-11-09

## 2018-11-09 LAB — INR PPP: 2.17

## 2018-12-06 LAB — INR PPP: 2.59

## 2019-01-02 ENCOUNTER — RESULT REVIEW (OUTPATIENT)
Age: 84
End: 2019-01-02

## 2019-01-04 LAB — INR PPP: 2.43

## 2019-01-22 ENCOUNTER — NON-APPOINTMENT (OUTPATIENT)
Age: 84
End: 2019-01-22

## 2019-01-22 ENCOUNTER — APPOINTMENT (OUTPATIENT)
Dept: CARDIOLOGY | Facility: CLINIC | Age: 84
End: 2019-01-22
Payer: MEDICARE

## 2019-01-22 VITALS
SYSTOLIC BLOOD PRESSURE: 156 MMHG | HEIGHT: 62 IN | DIASTOLIC BLOOD PRESSURE: 71 MMHG | OXYGEN SATURATION: 96 % | HEART RATE: 64 BPM | WEIGHT: 144 LBS | BODY MASS INDEX: 26.5 KG/M2

## 2019-01-22 PROCEDURE — 93000 ELECTROCARDIOGRAM COMPLETE: CPT

## 2019-01-22 PROCEDURE — 99214 OFFICE O/P EST MOD 30 MIN: CPT

## 2019-01-22 RX ORDER — CALCITONIN SALMON 200 [IU]/1
200 SPRAY, METERED NASAL
Qty: 37 | Refills: 0 | Status: DISCONTINUED | COMMUNITY
Start: 2017-09-14 | End: 2019-01-22

## 2019-01-30 ENCOUNTER — APPOINTMENT (OUTPATIENT)
Dept: CARDIOLOGY | Facility: CLINIC | Age: 84
End: 2019-01-30
Payer: MEDICARE

## 2019-01-30 PROCEDURE — 93306 TTE W/DOPPLER COMPLETE: CPT

## 2019-02-01 ENCOUNTER — RESULT REVIEW (OUTPATIENT)
Age: 84
End: 2019-02-01

## 2019-02-01 LAB — INR PPP: 2.67

## 2019-03-01 LAB — INR PPP: 2.5

## 2019-03-29 ENCOUNTER — CHART COPY (OUTPATIENT)
Age: 84
End: 2019-03-29

## 2019-03-29 LAB — INR PPP: 2.2

## 2019-04-11 RX ORDER — LISINOPRIL 10 MG/1
10 TABLET ORAL DAILY
Qty: 90 | Refills: 3 | Status: DISCONTINUED | COMMUNITY
Start: 1900-01-01 | End: 2019-04-11

## 2019-04-30 ENCOUNTER — OTHER (OUTPATIENT)
Age: 84
End: 2019-04-30

## 2019-04-30 LAB — INR PPP: 2.12

## 2019-05-28 ENCOUNTER — RESULT REVIEW (OUTPATIENT)
Age: 84
End: 2019-05-28

## 2019-05-29 ENCOUNTER — APPOINTMENT (OUTPATIENT)
Dept: CARDIOLOGY | Facility: CLINIC | Age: 84
End: 2019-05-29
Payer: MEDICARE

## 2019-05-29 ENCOUNTER — NON-APPOINTMENT (OUTPATIENT)
Age: 84
End: 2019-05-29

## 2019-05-29 VITALS
OXYGEN SATURATION: 95 % | HEART RATE: 61 BPM | SYSTOLIC BLOOD PRESSURE: 133 MMHG | HEIGHT: 62 IN | WEIGHT: 144 LBS | DIASTOLIC BLOOD PRESSURE: 91 MMHG | BODY MASS INDEX: 26.5 KG/M2

## 2019-05-29 LAB — INR PPP: 2.19

## 2019-05-29 PROCEDURE — 99214 OFFICE O/P EST MOD 30 MIN: CPT

## 2019-05-29 PROCEDURE — 93000 ELECTROCARDIOGRAM COMPLETE: CPT

## 2019-06-14 ENCOUNTER — OTHER (OUTPATIENT)
Age: 84
End: 2019-06-14

## 2019-06-14 LAB — INR PPP: 3.56

## 2019-06-18 ENCOUNTER — OTHER (OUTPATIENT)
Age: 84
End: 2019-06-18

## 2019-06-19 LAB — INR PPP: 2.7

## 2019-06-28 ENCOUNTER — OTHER (OUTPATIENT)
Age: 84
End: 2019-06-28

## 2019-06-29 LAB — INR PPP: 2.6

## 2019-07-27 ENCOUNTER — EMERGENCY (EMERGENCY)
Facility: HOSPITAL | Age: 84
LOS: 1 days | End: 2019-07-27
Attending: EMERGENCY MEDICINE
Payer: MEDICARE

## 2019-07-27 VITALS
DIASTOLIC BLOOD PRESSURE: 80 MMHG | RESPIRATION RATE: 14 BRPM | OXYGEN SATURATION: 96 % | WEIGHT: 139.99 LBS | SYSTOLIC BLOOD PRESSURE: 190 MMHG | TEMPERATURE: 98 F | HEART RATE: 72 BPM

## 2019-07-27 VITALS
SYSTOLIC BLOOD PRESSURE: 168 MMHG | RESPIRATION RATE: 16 BRPM | OXYGEN SATURATION: 97 % | DIASTOLIC BLOOD PRESSURE: 83 MMHG | HEART RATE: 61 BPM

## 2019-07-27 DIAGNOSIS — Z96.60 PRESENCE OF UNSPECIFIED ORTHOPEDIC JOINT IMPLANT: Chronic | ICD-10-CM

## 2019-07-27 LAB
ALBUMIN SERPL ELPH-MCNC: 4 G/DL — SIGNIFICANT CHANGE UP (ref 3.3–5)
ALP SERPL-CCNC: 62 U/L — SIGNIFICANT CHANGE UP (ref 40–120)
ALT FLD-CCNC: 34 U/L — SIGNIFICANT CHANGE UP (ref 12–78)
ANION GAP SERPL CALC-SCNC: 8 MMOL/L — SIGNIFICANT CHANGE UP (ref 5–17)
AST SERPL-CCNC: 31 U/L — SIGNIFICANT CHANGE UP (ref 15–37)
BASOPHILS # BLD AUTO: 0.02 K/UL — SIGNIFICANT CHANGE UP (ref 0–0.2)
BASOPHILS NFR BLD AUTO: 0.2 % — SIGNIFICANT CHANGE UP (ref 0–2)
BILIRUB SERPL-MCNC: 0.4 MG/DL — SIGNIFICANT CHANGE UP (ref 0.2–1.2)
BUN SERPL-MCNC: 18 MG/DL — SIGNIFICANT CHANGE UP (ref 7–23)
CALCIUM SERPL-MCNC: 9.2 MG/DL — SIGNIFICANT CHANGE UP (ref 8.5–10.1)
CHLORIDE SERPL-SCNC: 98 MMOL/L — SIGNIFICANT CHANGE UP (ref 96–108)
CO2 SERPL-SCNC: 29 MMOL/L — SIGNIFICANT CHANGE UP (ref 22–31)
CREAT SERPL-MCNC: 0.76 MG/DL — SIGNIFICANT CHANGE UP (ref 0.5–1.3)
EOSINOPHIL # BLD AUTO: 0.02 K/UL — SIGNIFICANT CHANGE UP (ref 0–0.5)
EOSINOPHIL NFR BLD AUTO: 0.2 % — SIGNIFICANT CHANGE UP (ref 0–6)
GLUCOSE SERPL-MCNC: 102 MG/DL — HIGH (ref 70–99)
HCT VFR BLD CALC: 41 % — SIGNIFICANT CHANGE UP (ref 34.5–45)
HGB BLD-MCNC: 13.8 G/DL — SIGNIFICANT CHANGE UP (ref 11.5–15.5)
IMM GRANULOCYTES NFR BLD AUTO: 0.2 % — SIGNIFICANT CHANGE UP (ref 0–1.5)
INR BLD: 3.22 RATIO — HIGH (ref 0.88–1.16)
LYMPHOCYTES # BLD AUTO: 1.77 K/UL — SIGNIFICANT CHANGE UP (ref 1–3.3)
LYMPHOCYTES # BLD AUTO: 14.3 % — SIGNIFICANT CHANGE UP (ref 13–44)
MCHC RBC-ENTMCNC: 30.1 PG — SIGNIFICANT CHANGE UP (ref 27–34)
MCHC RBC-ENTMCNC: 33.7 GM/DL — SIGNIFICANT CHANGE UP (ref 32–36)
MCV RBC AUTO: 89.5 FL — SIGNIFICANT CHANGE UP (ref 80–100)
MONOCYTES # BLD AUTO: 1.05 K/UL — HIGH (ref 0–0.9)
MONOCYTES NFR BLD AUTO: 8.5 % — SIGNIFICANT CHANGE UP (ref 2–14)
NEUTROPHILS # BLD AUTO: 9.53 K/UL — HIGH (ref 1.8–7.4)
NEUTROPHILS NFR BLD AUTO: 76.6 % — SIGNIFICANT CHANGE UP (ref 43–77)
NRBC # BLD: 0 /100 WBCS — SIGNIFICANT CHANGE UP (ref 0–0)
PLATELET # BLD AUTO: 256 K/UL — SIGNIFICANT CHANGE UP (ref 150–400)
POTASSIUM SERPL-MCNC: 4.2 MMOL/L — SIGNIFICANT CHANGE UP (ref 3.5–5.3)
POTASSIUM SERPL-SCNC: 4.2 MMOL/L — SIGNIFICANT CHANGE UP (ref 3.5–5.3)
PROT SERPL-MCNC: 7.5 G/DL — SIGNIFICANT CHANGE UP (ref 6–8.3)
PROTHROM AB SERPL-ACNC: 37.7 SEC — HIGH (ref 10–12.9)
RBC # BLD: 4.58 M/UL — SIGNIFICANT CHANGE UP (ref 3.8–5.2)
RBC # FLD: 13.1 % — SIGNIFICANT CHANGE UP (ref 10.3–14.5)
SODIUM SERPL-SCNC: 135 MMOL/L — SIGNIFICANT CHANGE UP (ref 135–145)
WBC # BLD: 12.42 K/UL — HIGH (ref 3.8–10.5)
WBC # FLD AUTO: 12.42 K/UL — HIGH (ref 3.8–10.5)

## 2019-07-27 PROCEDURE — 85610 PROTHROMBIN TIME: CPT

## 2019-07-27 PROCEDURE — 36415 COLL VENOUS BLD VENIPUNCTURE: CPT

## 2019-07-27 PROCEDURE — 99283 EMERGENCY DEPT VISIT LOW MDM: CPT | Mod: 25

## 2019-07-27 PROCEDURE — 93005 ELECTROCARDIOGRAM TRACING: CPT

## 2019-07-27 PROCEDURE — 80053 COMPREHEN METABOLIC PANEL: CPT

## 2019-07-27 PROCEDURE — 93010 ELECTROCARDIOGRAM REPORT: CPT

## 2019-07-27 PROCEDURE — 99283 EMERGENCY DEPT VISIT LOW MDM: CPT

## 2019-07-27 PROCEDURE — 85027 COMPLETE CBC AUTOMATED: CPT

## 2019-07-27 RX ORDER — CHLORTHALIDONE 50 MG
0.5 TABLET ORAL
Qty: 5 | Refills: 0
Start: 2019-07-27 | End: 2019-08-05

## 2019-07-27 NOTE — ED PROVIDER NOTE - NSFOLLOWUPINSTRUCTIONS_ED_ALL_ED_FT
Take your blood pressure tomorrow morning, if still elevated take chlorthalidone 12.5mg orally once daily. Take your regular medications as directed.

## 2019-07-27 NOTE — ED ADULT NURSE NOTE - OBJECTIVE STATEMENT
85 y/o female presents to ED with hypertension x1 day. Reports taking hypertension medication as directed. Denies HA, dizziness, CP and SOB. Reports /118 this morning.

## 2019-07-27 NOTE — ED PROVIDER NOTE - OBJECTIVE STATEMENT
86 female presents to ER with daughter c/o elevated blood pressure, patient felt flushed this morning and blood pressure was systolic of 220. Patient became nervous and has been taking it multiple times, now 190/80, denies chest pain, no shortness of breath, able to ambulate with out assistance. Patient took her metoprolol this morning 9am. Yesterday patient had a cortisone shot for shoulder pain at orthopedic office.

## 2019-07-27 NOTE — ED PROVIDER NOTE - CARE PROVIDER_API CALL
Vijay Piper)  Cardiovascular Disease  [address] [place] [address]  Phone: [phone]  Fax: [phone]  Follow Up Time:

## 2019-07-27 NOTE — ED PROVIDER NOTE - PROGRESS NOTE DETAILS
spoke with cardiology Dr. Wright, case discussed, blood pressure likley elevated due to steroid shot, blood pressure improving in ER with out intervention, plan to dc home, patient instructed totake blood pressure tomorrow, if elevated will start chlorthalidone 12.5mg once daily

## 2019-07-27 NOTE — ED ADULT NURSE NOTE - NSIMPLEMENTINTERV_GEN_ALL_ED
Implemented All Fall with Harm Risk Interventions:  Alexander to call system. Call bell, personal items and telephone within reach. Instruct patient to call for assistance. Room bathroom lighting operational. Non-slip footwear when patient is off stretcher. Physically safe environment: no spills, clutter or unnecessary equipment. Stretcher in lowest position, wheels locked, appropriate side rails in place. Provide visual cue, wrist band, yellow gown, etc. Monitor gait and stability. Monitor for mental status changes and reorient to person, place, and time. Review medications for side effects contributing to fall risk. Reinforce activity limits and safety measures with patient and family. Provide visual clues: red socks.

## 2019-07-29 ENCOUNTER — CHART COPY (OUTPATIENT)
Age: 84
End: 2019-07-29

## 2019-07-29 LAB — INR PPP: 2.87

## 2019-07-30 ENCOUNTER — NON-APPOINTMENT (OUTPATIENT)
Age: 84
End: 2019-07-30

## 2019-07-30 ENCOUNTER — APPOINTMENT (OUTPATIENT)
Dept: CARDIOLOGY | Facility: CLINIC | Age: 84
End: 2019-07-30
Payer: MEDICARE

## 2019-07-30 VITALS
SYSTOLIC BLOOD PRESSURE: 199 MMHG | DIASTOLIC BLOOD PRESSURE: 84 MMHG | WEIGHT: 142 LBS | BODY MASS INDEX: 26.13 KG/M2 | HEART RATE: 81 BPM | OXYGEN SATURATION: 96 % | HEIGHT: 62 IN

## 2019-07-30 PROCEDURE — 93000 ELECTROCARDIOGRAM COMPLETE: CPT

## 2019-07-30 PROCEDURE — 99214 OFFICE O/P EST MOD 30 MIN: CPT

## 2019-08-12 ENCOUNTER — RX RENEWAL (OUTPATIENT)
Age: 84
End: 2019-08-12

## 2019-08-26 LAB — INR PPP: 2.1

## 2019-08-28 ENCOUNTER — APPOINTMENT (OUTPATIENT)
Dept: CARDIOLOGY | Facility: CLINIC | Age: 84
End: 2019-08-28
Payer: MEDICARE

## 2019-08-28 ENCOUNTER — NON-APPOINTMENT (OUTPATIENT)
Age: 84
End: 2019-08-28

## 2019-08-28 VITALS
BODY MASS INDEX: 26.5 KG/M2 | WEIGHT: 144 LBS | DIASTOLIC BLOOD PRESSURE: 74 MMHG | OXYGEN SATURATION: 92 % | HEIGHT: 62 IN | SYSTOLIC BLOOD PRESSURE: 194 MMHG | HEART RATE: 73 BPM

## 2019-08-28 PROCEDURE — 93000 ELECTROCARDIOGRAM COMPLETE: CPT

## 2019-08-28 PROCEDURE — 99214 OFFICE O/P EST MOD 30 MIN: CPT

## 2019-09-09 NOTE — ED ADULT NURSE NOTE - CAS DISCH TRANSFER METHOD
- BNP elevated at 672  - Last echo 2015 EF 65%  - Echo on admission - results w/ EF of 55%, severe LA and RA enlargement, severe tricuspid regurgitation, and pulmonary hypertension  - Caregiver and patient do not want DNR - paperwork complete   - Fluids given for hypotension rather than pressors with plan to diuresis later if needed     Private car

## 2019-09-16 ENCOUNTER — APPOINTMENT (OUTPATIENT)
Dept: CARDIOLOGY | Facility: CLINIC | Age: 84
End: 2019-09-16

## 2019-09-23 LAB — INR PPP: 2.1

## 2019-10-21 ENCOUNTER — OTHER (OUTPATIENT)
Age: 84
End: 2019-10-21

## 2019-10-22 LAB — INR PPP: 2.3

## 2019-11-18 LAB — INR PPP: 2.67

## 2019-11-25 ENCOUNTER — NON-APPOINTMENT (OUTPATIENT)
Age: 84
End: 2019-11-25

## 2019-11-25 ENCOUNTER — APPOINTMENT (OUTPATIENT)
Dept: CARDIOLOGY | Facility: CLINIC | Age: 84
End: 2019-11-25
Payer: MEDICARE

## 2019-11-25 VITALS
WEIGHT: 146 LBS | BODY MASS INDEX: 26.87 KG/M2 | HEIGHT: 62 IN | DIASTOLIC BLOOD PRESSURE: 80 MMHG | SYSTOLIC BLOOD PRESSURE: 181 MMHG | OXYGEN SATURATION: 96 % | HEART RATE: 70 BPM

## 2019-11-25 PROCEDURE — 93000 ELECTROCARDIOGRAM COMPLETE: CPT

## 2019-11-25 PROCEDURE — 99214 OFFICE O/P EST MOD 30 MIN: CPT

## 2019-12-16 LAB — INR PPP: 1.85

## 2019-12-17 ENCOUNTER — APPOINTMENT (OUTPATIENT)
Dept: CARDIOLOGY | Facility: CLINIC | Age: 84
End: 2019-12-17
Payer: MEDICARE

## 2019-12-17 ENCOUNTER — NON-APPOINTMENT (OUTPATIENT)
Age: 84
End: 2019-12-17

## 2019-12-17 VITALS
SYSTOLIC BLOOD PRESSURE: 179 MMHG | HEIGHT: 62 IN | DIASTOLIC BLOOD PRESSURE: 75 MMHG | HEART RATE: 63 BPM | OXYGEN SATURATION: 92 % | BODY MASS INDEX: 26.31 KG/M2 | WEIGHT: 143 LBS

## 2019-12-17 PROCEDURE — 93000 ELECTROCARDIOGRAM COMPLETE: CPT

## 2019-12-17 PROCEDURE — 99214 OFFICE O/P EST MOD 30 MIN: CPT

## 2019-12-31 LAB — INR PPP: 2.24

## 2020-01-27 LAB — INR PPP: 2.39

## 2020-02-10 ENCOUNTER — NON-APPOINTMENT (OUTPATIENT)
Age: 85
End: 2020-02-10

## 2020-02-10 ENCOUNTER — APPOINTMENT (OUTPATIENT)
Dept: CARDIOLOGY | Facility: CLINIC | Age: 85
End: 2020-02-10
Payer: MEDICARE

## 2020-02-10 VITALS
HEIGHT: 62 IN | BODY MASS INDEX: 26.68 KG/M2 | WEIGHT: 145 LBS | SYSTOLIC BLOOD PRESSURE: 180 MMHG | HEART RATE: 64 BPM | OXYGEN SATURATION: 94 % | DIASTOLIC BLOOD PRESSURE: 81 MMHG

## 2020-02-10 PROCEDURE — 93000 ELECTROCARDIOGRAM COMPLETE: CPT

## 2020-02-10 PROCEDURE — 99214 OFFICE O/P EST MOD 30 MIN: CPT

## 2020-02-26 LAB — INR PPP: 2.41

## 2020-03-23 LAB — INR PPP: 2.45

## 2020-04-06 ENCOUNTER — APPOINTMENT (OUTPATIENT)
Dept: CARDIOLOGY | Facility: CLINIC | Age: 85
End: 2020-04-06

## 2020-04-21 LAB — INR PPP: 2.46

## 2020-05-19 LAB — INR PPP: 1.96

## 2020-06-17 ENCOUNTER — NON-APPOINTMENT (OUTPATIENT)
Age: 85
End: 2020-06-17

## 2020-06-17 ENCOUNTER — APPOINTMENT (OUTPATIENT)
Dept: CARDIOLOGY | Facility: CLINIC | Age: 85
End: 2020-06-17
Payer: MEDICARE

## 2020-06-17 VITALS
WEIGHT: 144.5 LBS | DIASTOLIC BLOOD PRESSURE: 103 MMHG | BODY MASS INDEX: 26.59 KG/M2 | OXYGEN SATURATION: 96 % | HEART RATE: 146 BPM | SYSTOLIC BLOOD PRESSURE: 153 MMHG | HEIGHT: 62 IN

## 2020-06-17 VITALS — SYSTOLIC BLOOD PRESSURE: 170 MMHG | DIASTOLIC BLOOD PRESSURE: 92 MMHG

## 2020-06-17 DIAGNOSIS — I10 ESSENTIAL (PRIMARY) HYPERTENSION: ICD-10-CM

## 2020-06-17 LAB — INR PPP: 2.5

## 2020-06-17 PROCEDURE — 93000 ELECTROCARDIOGRAM COMPLETE: CPT

## 2020-06-17 PROCEDURE — 99215 OFFICE O/P EST HI 40 MIN: CPT

## 2020-06-22 ENCOUNTER — APPOINTMENT (OUTPATIENT)
Dept: CARDIOLOGY | Facility: CLINIC | Age: 85
End: 2020-06-22
Payer: MEDICARE

## 2020-06-23 ENCOUNTER — NON-APPOINTMENT (OUTPATIENT)
Age: 85
End: 2020-06-23

## 2020-06-23 ENCOUNTER — APPOINTMENT (OUTPATIENT)
Dept: CARDIOLOGY | Facility: CLINIC | Age: 85
End: 2020-06-23
Payer: MEDICARE

## 2020-06-23 VITALS
DIASTOLIC BLOOD PRESSURE: 82 MMHG | HEIGHT: 62 IN | HEART RATE: 50 BPM | WEIGHT: 150 LBS | BODY MASS INDEX: 27.6 KG/M2 | OXYGEN SATURATION: 96 % | SYSTOLIC BLOOD PRESSURE: 149 MMHG

## 2020-06-23 PROCEDURE — 93000 ELECTROCARDIOGRAM COMPLETE: CPT

## 2020-06-23 PROCEDURE — 99214 OFFICE O/P EST MOD 30 MIN: CPT

## 2020-06-29 PROCEDURE — 93224 XTRNL ECG REC UP TO 48 HRS: CPT

## 2020-07-14 DIAGNOSIS — Z79.01 LONG TERM (CURRENT) USE OF ANTICOAGULANTS: ICD-10-CM

## 2020-07-14 DIAGNOSIS — Z51.81 ENCOUNTER FOR THERAPEUTIC DRUG LVL MONITORING: ICD-10-CM

## 2020-07-14 DIAGNOSIS — Z79.01 ENCOUNTER FOR THERAPEUTIC DRUG LVL MONITORING: ICD-10-CM

## 2020-07-15 LAB — INR PPP: 2.22

## 2020-08-11 LAB — INR PPP: 2.02

## 2020-08-28 ENCOUNTER — NON-APPOINTMENT (OUTPATIENT)
Age: 85
End: 2020-08-28

## 2020-08-28 ENCOUNTER — APPOINTMENT (OUTPATIENT)
Dept: CARDIOLOGY | Facility: CLINIC | Age: 85
End: 2020-08-28
Payer: MEDICARE

## 2020-08-28 VITALS
SYSTOLIC BLOOD PRESSURE: 146 MMHG | HEIGHT: 62 IN | HEART RATE: 84 BPM | WEIGHT: 146 LBS | BODY MASS INDEX: 26.87 KG/M2 | DIASTOLIC BLOOD PRESSURE: 77 MMHG | OXYGEN SATURATION: 93 %

## 2020-08-28 DIAGNOSIS — R60.9 EDEMA, UNSPECIFIED: ICD-10-CM

## 2020-08-28 DIAGNOSIS — I48.0 PAROXYSMAL ATRIAL FIBRILLATION: ICD-10-CM

## 2020-08-28 PROCEDURE — 93000 ELECTROCARDIOGRAM COMPLETE: CPT

## 2020-08-28 PROCEDURE — 99214 OFFICE O/P EST MOD 30 MIN: CPT

## 2020-09-11 ENCOUNTER — RX RENEWAL (OUTPATIENT)
Age: 85
End: 2020-09-11

## 2020-10-29 LAB
INR PPP: 1.99
INR PPP: 2.91

## 2020-11-07 LAB — INR PPP: 3.75

## 2020-11-16 ENCOUNTER — NON-APPOINTMENT (OUTPATIENT)
Age: 85
End: 2020-11-16

## 2020-11-16 ENCOUNTER — APPOINTMENT (OUTPATIENT)
Dept: CARDIOLOGY | Facility: CLINIC | Age: 85
End: 2020-11-16
Payer: MEDICARE

## 2020-11-16 VITALS
HEIGHT: 62 IN | SYSTOLIC BLOOD PRESSURE: 157 MMHG | OXYGEN SATURATION: 98 % | DIASTOLIC BLOOD PRESSURE: 85 MMHG | HEART RATE: 96 BPM | WEIGHT: 146 LBS | BODY MASS INDEX: 26.87 KG/M2

## 2020-11-16 LAB
INR PPP: 2 RATIO
QUALITY CONTROL: YES

## 2020-11-16 PROCEDURE — 93000 ELECTROCARDIOGRAM COMPLETE: CPT

## 2020-11-16 PROCEDURE — 99214 OFFICE O/P EST MOD 30 MIN: CPT

## 2020-12-02 LAB — INR PPP: 3.44

## 2020-12-09 ENCOUNTER — RX RENEWAL (OUTPATIENT)
Age: 85
End: 2020-12-09

## 2020-12-10 LAB — INR PPP: 2.73

## 2020-12-23 LAB — INR PPP: 2.38

## 2021-01-07 LAB — INR PPP: 3.02

## 2021-01-28 LAB — INR PPP: 2.12

## 2021-02-22 LAB — INR PPP: 2.2

## 2021-03-05 ENCOUNTER — RX RENEWAL (OUTPATIENT)
Age: 86
End: 2021-03-05

## 2021-03-18 LAB — INR PPP: 2.36

## 2021-04-15 LAB — INR PPP: 1.98

## 2021-05-11 LAB — INR PPP: 1.96

## 2021-05-17 ENCOUNTER — APPOINTMENT (OUTPATIENT)
Dept: CARDIOLOGY | Facility: CLINIC | Age: 86
End: 2021-05-17
Payer: MEDICARE

## 2021-05-17 ENCOUNTER — NON-APPOINTMENT (OUTPATIENT)
Age: 86
End: 2021-05-17

## 2021-05-17 VITALS
HEIGHT: 63 IN | BODY MASS INDEX: 25.69 KG/M2 | OXYGEN SATURATION: 97 % | DIASTOLIC BLOOD PRESSURE: 77 MMHG | SYSTOLIC BLOOD PRESSURE: 164 MMHG | WEIGHT: 145 LBS | HEART RATE: 93 BPM

## 2021-05-17 PROCEDURE — 99214 OFFICE O/P EST MOD 30 MIN: CPT

## 2021-05-17 PROCEDURE — 93000 ELECTROCARDIOGRAM COMPLETE: CPT

## 2021-05-20 RX ORDER — CHLORTHALIDONE 25 MG/1
25 TABLET ORAL DAILY
Qty: 90 | Refills: 2 | Status: ACTIVE | COMMUNITY
Start: 2020-06-23 | End: 1900-01-01

## 2021-05-24 ENCOUNTER — RESULT CHARGE (OUTPATIENT)
Age: 86
End: 2021-05-24

## 2021-06-03 LAB — INR PPP: 2.69

## 2021-06-07 LAB — INR PPP: 1.9

## 2021-06-29 LAB — INR PPP: 2.48

## 2021-07-07 ENCOUNTER — APPOINTMENT (OUTPATIENT)
Dept: CARDIOLOGY | Facility: CLINIC | Age: 86
End: 2021-07-07
Payer: MEDICARE

## 2021-07-07 ENCOUNTER — NON-APPOINTMENT (OUTPATIENT)
Age: 86
End: 2021-07-07

## 2021-07-07 VITALS
OXYGEN SATURATION: 94 % | SYSTOLIC BLOOD PRESSURE: 136 MMHG | HEIGHT: 63 IN | HEART RATE: 88 BPM | WEIGHT: 139 LBS | BODY MASS INDEX: 24.63 KG/M2 | DIASTOLIC BLOOD PRESSURE: 77 MMHG

## 2021-07-07 DIAGNOSIS — R06.00 DYSPNEA, UNSPECIFIED: ICD-10-CM

## 2021-07-07 DIAGNOSIS — I48.91 UNSPECIFIED ATRIAL FIBRILLATION: ICD-10-CM

## 2021-07-07 PROCEDURE — 99214 OFFICE O/P EST MOD 30 MIN: CPT

## 2021-07-07 PROCEDURE — 93000 ELECTROCARDIOGRAM COMPLETE: CPT

## 2021-07-07 RX ORDER — HYDRALAZINE HYDROCHLORIDE 50 MG/1
50 TABLET ORAL
Qty: 270 | Refills: 3 | Status: ACTIVE | COMMUNITY
Start: 2019-07-30

## 2021-07-07 RX ORDER — ROSUVASTATIN CALCIUM 5 MG/1
5 TABLET, FILM COATED ORAL
Qty: 90 | Refills: 3 | Status: ACTIVE | COMMUNITY
Start: 2020-06-17

## 2021-07-07 RX ORDER — WARFARIN 5 MG/1
5 TABLET ORAL DAILY
Qty: 90 | Refills: 3 | Status: ACTIVE | COMMUNITY

## 2021-07-20 LAB — INR PPP: 2.27

## 2021-08-19 LAB — INR PPP: 2.78

## 2022-02-22 ENCOUNTER — RX RENEWAL (OUTPATIENT)
Age: 87
End: 2022-02-22

## 2022-02-22 RX ORDER — DILTIAZEM HYDROCHLORIDE 240 MG/1
240 CAPSULE, EXTENDED RELEASE ORAL
Qty: 90 | Refills: 3 | Status: ACTIVE | COMMUNITY
Start: 2020-06-17

## 2023-05-08 NOTE — ED PROVIDER NOTE - RADIATION
No care due was identified.  [facility] Embedded Care Due Messages. Reference number: [id number].   5/08/2023 8:41:59 AM CDT   no radiation

## 2023-12-07 NOTE — ED PROVIDER NOTE - DISCHARGE DATE
"Chief Complaint  Mixed Anxiety and Depressive Disorder and Mood Disorder    Subjective    History of Present Illness      Patient presents to [facility] for   History of Present Illness  Pt is here today for Anxiety, Depression, and Mood Disorder f/u. She reports no problems or concerns at this time.       Review of Systems    I have reviewed and agree with the HPI and ROS information as above.  Tony Gottlieb MD     Objective   Vital Signs:   /79   Pulse 90   Ht 152.4 cm (60\")   Wt 53.1 kg (117 lb)   SpO2 100%   BMI 22.85 kg/m²     BMI is within normal parameters. No other follow-up for BMI required.      Physical Exam  Vitals and nursing note reviewed.   Constitutional:       Appearance: Normal appearance. She is well-developed.   HENT:      Head: Normocephalic and atraumatic.      Right Ear: Tympanic membrane, ear canal and external ear normal.      Left Ear: Tympanic membrane, ear canal and external ear normal.      Nose: Nose normal. No septal deviation, nasal tenderness or congestion.      Mouth/Throat:      Lips: Pink. No lesions.      Mouth: Mucous membranes are moist. No oral lesions.      Dentition: Normal dentition.      Pharynx: Oropharynx is clear. No pharyngeal swelling, oropharyngeal exudate or posterior oropharyngeal erythema.   Eyes:      General: Lids are normal. Vision grossly intact. No scleral icterus.        Right eye: No discharge.         Left eye: No discharge.      Extraocular Movements: Extraocular movements intact.      Conjunctiva/sclera: Conjunctivae normal.      Right eye: Right conjunctiva is not injected.      Left eye: Left conjunctiva is not injected.      Pupils: Pupils are equal, round, and reactive to light.   Neck:      Thyroid: No thyroid mass.      Trachea: Trachea normal.   Cardiovascular:      Rate and Rhythm: Normal rate and regular rhythm.      Heart sounds: Normal heart sounds. No murmur heard.     No gallop.   Pulmonary:      " Effort: Pulmonary effort is normal.      Breath sounds: Normal breath sounds and air entry. No wheezing, rhonchi or rales.   Abdominal:      General: There is no distension.      Palpations: Abdomen is soft. There is no mass.      Tenderness: There is no abdominal tenderness. There is no right CVA tenderness, left CVA tenderness, guarding or rebound.   Musculoskeletal:         General: No tenderness or deformity. Normal range of motion.      Cervical back: Full passive range of motion without pain, normal range of motion and neck supple.      Thoracic back: Normal.      Right lower leg: No edema.      Left lower leg: No edema.   Skin:     General: Skin is warm and dry.      Coloration: Skin is not jaundiced.      Findings: No rash.   Neurological:      Mental Status: She is alert and oriented to person, place, and time.      Sensory: Sensation is intact.      Motor: Motor function is intact.      Coordination: Coordination is intact.      Gait: Gait is intact.      Deep Tendon Reflexes: Reflexes are normal and symmetric.   Psychiatric:         Mood and Affect: Mood and affect normal.         Judgment: Judgment normal.        Result Review  Data Reviewed:                   Assessment and Plan      Diagnoses and all orders for this visit:    1. Mood disorder (Primary)  Assessment & Plan:  Doing well.  We will continue same.      2. Mixed anxiety and depressive disorder  Assessment & Plan:  Controlled.      3. Insomnia, unspecified type  Assessment & Plan:  Sleeping well.              Follow Up   Return in about 3 months (around 3/7/2024) for Recheck.  Patient was given instructions and counseling regarding her condition or for health maintenance advice. Please see specific information pulled into the AVS if appropriate.        27-Jul-2019
